# Patient Record
Sex: FEMALE | Race: WHITE | URBAN - METROPOLITAN AREA
[De-identification: names, ages, dates, MRNs, and addresses within clinical notes are randomized per-mention and may not be internally consistent; named-entity substitution may affect disease eponyms.]

---
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
## 2019-07-25 NOTE — PROGRESS NOTES
SUBJECTIVE:                                                    Fariha Herrera is a 16 year old female who presents to clinic today for the following health issues:    1. Recheck weight, questions about blood work.  Has appointment with Dr. Grullon next week     2. Discuss mirtazapine and medication for nausea. Patient has questions about trying something different for her nausea.   Has been on antivert, hydroxyzine and remeron with little benefit.       Problem list and histories reviewed & adjusted, as indicated.  Additional history: as documented    Patient Active Problem List   Diagnosis     Nausea     Loss of weight     Tree nut allergy     Vegetarian diet     Past Surgical History:   Procedure Laterality Date     COLONOSCOPY N/A 10/21/2016    Procedure: COMBINED COLONOSCOPY, SINGLE OR MULTIPLE BIOPSY/POLYPECTOMY BY BIOPSY;  Surgeon: Rosie Dawn MD;  Location: [facility]      ESOPHAGOSCOPY, GASTROSCOPY, DUODENOSCOPY (EGD), COMBINED N/A 10/21/2016    Procedure: COMBINED ESOPHAGOSCOPY, GASTROSCOPY, DUODENOSCOPY (EGD), BIOPSY SINGLE OR MULTIPLE;  Surgeon: Rosie Dawn MD;  Location: [facility]      ORTHOPEDIC SURGERY      broken arm and elbow       Social History   Substance Use Topics     Smoking status: Never Smoker     Smokeless tobacco: Never Used     Alcohol use No     Family History   Problem Relation Age of Onset     Hypertension Mother      Hyperlipidemia Mother      DIABETES Maternal Grandmother      Hypertension Brother      Hypertension Maternal Aunt      Breast Cancer Maternal Aunt      Colon Cancer Maternal Aunt      Prostate Cancer Maternal Aunt      Coronary Artery Disease No family hx of      CEREBROVASCULAR DISEASE No family hx of      Other Cancer No family hx of      Depression No family hx of      Anxiety Disorder No family hx of      MENTAL ILLNESS No family hx of      Substance Abuse No family hx of      Anesthesia Reaction No family hx of       Asthma No family hx of      OSTEOPOROSIS No family hx of      Genetic Disorder No family hx of      Thyroid Disease No family hx of      Obesity No family hx of      Unknown/Adopted No family hx of          Current Outpatient Prescriptions   Medication Sig Dispense Refill     hydrOXYzine (ATARAX) 10 MG tablet TAKE 1 TO 2 TABLETS TWICE A DAY AS NEEDED FOR ANXIETY 90 tablet 1     mirtazapine (REMERON) 15 MG tablet Take 1 tablet (15 mg) by mouth At Bedtime 30 tablet 0     meclizine (ANTIVERT) 25 MG tablet Take 1 tablet (25 mg) by mouth every 6 hours as needed for nausea 30 tablet 1     ondansetron (ZOFRAN) 4 MG tablet Take 1 tablet (4 mg) by mouth every 8 hours as needed for nausea 30 tablet 1     Multiple Vitamins-Minerals (MULTIVITAMIN ADULT PO) Take by mouth daily       ondansetron (ZOFRAN-ODT) 4 MG ODT tab Take 1 tablet (4 mg) by mouth every 6 hours as needed 40 tablet 1     fluticasone (FLONASE) 50 MCG/ACT nasal spray Spray 1-2 sprays into both nostrils daily 1 Bottle 11     EPINEPHrine 0.3 MG/0.3ML injection 2-pack Inject 0.3 mg into the muscle       levalbuterol (XOPENEX HFA) 45 MCG/ACT inhaler Inhale 1 puff into the lungs every 6 hours as needed        Allergies   Allergen Reactions     Food Unknown     PN: possibly cherries     Penicillins      Tree Nuts [Nuts]        Reviewed and updated as needed this visit by clinical staff  Tobacco  Meds  Med Hx  Surg Hx  Fam Hx  Soc Hx      Reviewed and updated as needed this visit by Provider         ROS:  CONSTITUTIONAL:POSITIVE  for anorexia, fatigue and weight loss and NEGATIVE  for arthralgias  ENT/MOUTH: NEGATIVE for ear, mouth and throat problems  RESP:NEGATIVE for significant cough or SOB  CV: NEGATIVE for chest pain, palpitations or peripheral edema  GI: POSITIVE for nausea and poor appetite and NEGATIVE for abdominal pain generalized, jaundice and vomiting  MUSCULOSKELETAL: NEGATIVE for significant arthralgias or myalgia  ENDOCRINE: NEGATIVE for  temperature intolerance, skin/hair changes  HEME/ALLERGY/IMMUNE: NEGATIVE for allergies and anemia  PSYCHIATRIC: POSITIVE forHx anxiety and fatigue and NEGATIVE foralcohol abuse, Hx panic attacks, drug usage, thoughts of hurting someone else and thoughts of self harm    OBJECTIVE:                                                    BP 99/60 (BP Location: Right arm, Patient Position: Chair, Cuff Size: Adult Regular)  Pulse 57  Temp 97.7  F (36.5  C) (Temporal)  Wt 105 lb 3.2 oz (47.7 kg)  SpO2 100%  Breastfeeding? No  BMI 16.98 kg/m2  Body mass index is 16.98 kg/(m^2).   Wt Readings from Last 4 Encounters:   04/04/17 105 lb 3.2 oz (47.7 kg) (11 %)*   03/17/17 105 lb 4.8 oz (47.8 kg) (11 %)*   02/21/17 110 lb 3.2 oz (50 kg) (20 %)*   02/07/17 112 lb 6.4 oz (51 kg) (24 %)*     * Growth percentiles are based on CDC 2-20 Years data.       GENERAL APPEARANCE: alert, active and no distress  RESP: lungs clear to auscultation - no rales, rhonchi or wheezes  CV: regular rates and rhythm and no murmur, click or rub  MS: extremities normal- no gross deformities noted  SKIN: no suspicious lesions or rashes  NEURO: Normal strength and tone, mentation intact and speech normal  PSYCH: mentation appears normal and affect normal/bright  MENTAL STATUS EXAM:  Appearance/Behavior: No apparent distress, Casually groomed and Dressed inappropriately for weather  Speech: Normal  Mood/Affect: depressed affect  Insight: Fair    Diagnostic Test Results:  Results for orders placed or performed in visit on 03/24/17   **Basic metabolic panel FUTURE anytime   Result Value Ref Range    Sodium 142 133 - 144 mmol/L    Potassium 3.8 3.4 - 5.3 mmol/L    Chloride 105 96 - 110 mmol/L    Carbon Dioxide 33 (H) 20 - 32 mmol/L    Anion Gap 4 3 - 14 mmol/L    Glucose 85 70 - 99 mg/dL    Urea Nitrogen 6 (L) 7 - 19 mg/dL    Creatinine 0.61 0.50 - 1.00 mg/dL    GFR Estimate >90  Non  GFR Calc   >60 mL/min/1.7m2    GFR Estimate If Black  >90   GFR Calc   >60 mL/min/1.7m2    Calcium 9.2 9.1 - 10.3 mg/dL        ASSESSMENT/PLAN:                                                        Fariha was seen today for weight check and medication question.    Diagnoses and all orders for this visit:    Nausea  -     promethazine (PHENERGAN) 25 MG tablet; Take 1 tablet (25 mg) by mouth every 6 hours as needed for nausea    Loss of weight  -     promethazine (PHENERGAN) 25 MG tablet; Take 1 tablet (25 mg) by mouth every 6 hours as needed for nausea  PLAN:   1.   Symptomatic therapy suggested: wean off the Remeron over the next 4 days   Will try the phenergan instead of the atarax prior to meals  May want to try half tablet first .  2.  Orders Placed This Encounter   Medications     promethazine (PHENERGAN) 25 MG tablet     Sig: Take 1 tablet (25 mg) by mouth every 6 hours as needed for nausea     Dispense:  20 tablet     Refill:  1     3. Patient needs to follow up in if no improvement,or sooner if worsening of symptoms or other symptoms develop.  Follow up with Dr Grullon next week as planned    See Patient Instructions    KATJA Bennett CNP  [facility]   Discharged

## 2020-03-10 ENCOUNTER — HEALTH MAINTENANCE LETTER (OUTPATIENT)
Age: 20
End: 2020-03-10

## 2020-12-27 ENCOUNTER — HEALTH MAINTENANCE LETTER (OUTPATIENT)
Age: 20
End: 2020-12-27

## 2021-04-24 ENCOUNTER — HEALTH MAINTENANCE LETTER (OUTPATIENT)
Age: 21
End: 2021-04-24

## 2021-06-07 NOTE — TELEPHONE ENCOUNTER
Called patient's mom Kamille per note in under contacts to let patient know Loretta Clovis CNP, response. Left message for her to call clinic back at [phone] and ask to speak with a nurse.    Zita Damico RN      204.1

## 2021-10-09 ENCOUNTER — HEALTH MAINTENANCE LETTER (OUTPATIENT)
Age: 21
End: 2021-10-09

## 2022-05-16 ENCOUNTER — HEALTH MAINTENANCE LETTER (OUTPATIENT)
Age: 22
End: 2022-05-16

## 2022-09-11 ENCOUNTER — HEALTH MAINTENANCE LETTER (OUTPATIENT)
Age: 22
End: 2022-09-11

## 2023-04-17 NOTE — TELEPHONE ENCOUNTER
no jaundice present , good turgor , no masses , no tenderness on palpation  "Call Type: Triage Call    Presenting Problem: PT momKamille  [phone] , calling to request  pt be prescribs Tamiflu Rx for preventative since exposed and .  [phone].   Exposed to Mom's suspected flu ( Fever 101 and  cough and bodyaches and headache started 3/2/17  )  and malnutrition  with  BMI 18.8 =106lb at  5'7\" due chronic nausea  for 11 months -  [facility] and eating disorders - [facility] for  cause of nausea )  Taking Mirtazapine and Hydroxyzine only now .  [facility] pharmacy phone .   @1021am Page   Evie to page Dr Raza Sears to [facility]  to discuss  and  Dr Sears declines Tamiflu  because was not a confirmed case.  Triage Note:  Guideline Title: Influenza (Flu) Exposure (Pediatric)  Recommended Disposition: Call Provider within 24 Hours  Original Inclination: Did not know what to do  Override Disposition:  Intended Action: Call PCP/HCP  Physician Contacted: No  [1] Influenza EXPOSURE (Close Contact) within last 48 hours (2 days) AND [2] HIGH  RISK child (underlying heart or lung disease OR weak immune system, etc.-- See  that list) AND [3] NO symptoms ?  YES  Influenza vaccine reaction suspected ? NO  [1] Influenza EXPOSURE (Close Contact) within last 7 days AND [2] fever or  respiratory symptoms (cough, sore throat, or runny nose) ? NO  [1] Influenza has been diagnosed by a HCP AND [2] follow-up call ? NO  [place] flu (Bird Flu) exposure ? NO  Influenza suspected ? NO  Physician Instructions:  Care Advice: CALL BACK IF: * Your child develops flu symptoms (fever, cough,  runny nose, sore throat) * You have other questions or concerns  HIGH-RISK CHILDREN FOR COMPLICATIONS OF THE FLU: * Lung disease (such as  asthma) * Heart disease (such as congenital heart disease) * Neuromuscular  disease (such as muscular dystrophy, epilepsy) * Cancer or weak immune  system conditions * Diabetes, sickle cell disease, kidney disease OR other  chronic disease * " Diseases requiring long-term aspirin therapy * Pregnancy  * Morbid Obesity (BMI > 40) * Healthy children under 2 years old are not  considered HIGH-RISK following flu exposure alone * Note: All other  children are referred to as LOW-RISK  INFLUENZA SYMPTOMS: * The main symptoms are fever, cough, sore throat and  runny nose. * Fever is usually present (Exception: immune-compromised  children). * Other common symptoms are muscle pain, headache and fatigue. *  Some people also have vomiting and diarrhea, but never as the only symptom.  CALL PCP WITHIN 24 HOURS: You need to discuss this with your child's doctor  within the next 24 hours. * IF OFFICE WILL BE OPEN: Call the office when it  opens tomorrow morning. * IF OFFICE WILL BE CLOSED: I'll page him now.  (Exception: from 9 pm to 9 am. Since this isn't urgent, we'll hold the page  until morning.)

## 2023-06-03 ENCOUNTER — HEALTH MAINTENANCE LETTER (OUTPATIENT)
Age: 23
End: 2023-06-03

## 2023-10-01 NOTE — TELEPHONE ENCOUNTER
Advised on diet modifications increasing lean protein Called [facility] with patient and mother Rosita, informed them that the patient still has 4 refills remaining. Rosita reports that they want to switch to [facility] home delivery. Initially patient's insurance only allows for a 30 day supply at a time. Called [facility] to confirm how script should be written out so that there are no further insurance issues. Turns out that the below refill is incorrect as [facility] could not take the below dispensed 90 tablets due to patient's insurance.     New order sent to [facility].      Fluxetine (PROZAC) 20 mg tablet  Last Written Prescription Date: 6/22/2017  Last Fill Quantity: 90, # refills: 1  [place] [facility] [place]:  5/26/2017        Last PHQ-9 score on record= Avoidant and restrictive food intake disorder [F50.89]  - Primary   PHQ-9 SCORE 1/13/2017   Total Score 5       Refilled per [facility] protocol.    Zita Damico RN

## 2025-07-16 NOTE — TELEPHONE ENCOUNTER
[facility] Call Center    Phone Message    Name of Caller: Kamille    Phone Number: Cell number on file:    Telephone Information:   Mobile [phone]       Best time to return call: any    May a detailed message be left on voicemail: yes    Relation to patient: Mother    Reason for Call: Other: Patients mother is calling requesting to speak with Loretta haskins team about some concerns she has with the patients new medication change. Please advise.     Action Taken: Message routed to:  Primary Care p [phone]       Ophtho eval  - Advised to seek immediate medical attention if experiencing curtain-like sensation over the eye, severe headache, nausea, vomiting, or fever or blurred vision.